# Patient Record
Sex: MALE | Race: WHITE | Employment: FULL TIME | URBAN - METROPOLITAN AREA
[De-identification: names, ages, dates, MRNs, and addresses within clinical notes are randomized per-mention and may not be internally consistent; named-entity substitution may affect disease eponyms.]

---

## 2017-03-07 ENCOUNTER — TELEPHONE (OUTPATIENT)
Dept: CARDIOLOGY CLINIC | Age: 47
End: 2017-03-07

## 2017-03-07 NOTE — TELEPHONE ENCOUNTER
Mr. Jemma Levin saw Dr. Beatris Carbone a couple years back and while the two of them were conversing Dr. Beatris Carbone mentioned him having some lab testing done along with cardiac testing. Mr. Jemma Levin would like to know if Dr. Beatris Carbone would be willing to provide orders for these before his scheduled appointment on 4/3/17. Phone [phone].      Thank you, Eduardo Leach

## 2017-03-07 NOTE — TELEPHONE ENCOUNTER
Melida Rollins will have my nurse Lesly Bush coordinate lab testing prior to your visit. Look forward to seeing you. Lesly Bush   Can you send Mr Moffett a lab slip for [facility] to be drawn 2 weeks prior to his office visit? From: [email or web address]  Date: March 7, 2017 at 10:36:37 AM AST  To: [email or web address]  Subject: #ExtMail# Appointment on April 3      Dear Bryan Luna,   I had the pleasure of meeting you a couple of years ago at a [employer] event. I just scheduled an appointment with you for April 3 and was hoping your office could order an advanced lipid test prior to our appointment (so that we could discuss on April 3). For what it is worth I also completed a CT scan with Jeremy Delricky last year. Best regards,   Dena Postin    E-mail received. Patient notified that lab requisition has been mailed. [facility] lab number provided for billing questions.

## 2017-03-24 ENCOUNTER — TELEPHONE (OUTPATIENT)
Dept: CARDIOLOGY CLINIC | Age: 47
End: 2017-03-24

## 2017-03-24 DIAGNOSIS — E78.9 ABNORMAL CHOLESTEROL TEST: Primary | ICD-10-CM

## 2017-03-29 LAB
25(OH)D3+25(OH)D2 SERPL-MCNC: 23.9 NG/ML (ref 30–100)
ALBUMIN SERPL-MCNC: 4.4 G/DL (ref 3.5–5.5)
ALBUMIN/GLOB SERPL: 1.7 {RATIO} (ref 1.2–2.2)
ALP SERPL-CCNC: 41 IU/L (ref 39–117)
ALT SERPL-CCNC: 19 IU/L (ref 0–44)
AST SERPL-CCNC: 23 IU/L (ref 0–40)
BILIRUB SERPL-MCNC: 0.7 MG/DL (ref 0–1.2)
BUN SERPL-MCNC: 19 MG/DL (ref 6–24)
BUN/CREAT SERPL: 18 (ref 9–20)
CALCIUM SERPL-MCNC: 9.5 MG/DL (ref 8.7–10.2)
CHLORIDE SERPL-SCNC: 101 MMOL/L (ref 96–106)
CHOLEST SERPL-MCNC: 185 MG/DL (ref 100–199)
CO2 SERPL-SCNC: 27 MMOL/L (ref 18–29)
CREAT SERPL-MCNC: 1.08 MG/DL (ref 0.76–1.27)
GLOBULIN SER CALC-MCNC: 2.6 G/DL (ref 1.5–4.5)
GLUCOSE SERPL-MCNC: 96 MG/DL (ref 65–99)
HBA1C MFR BLD: 5.9 % (ref 4.8–5.6)
HDL SERPL-SCNC: 29.6 UMOL/L
HDLC SERPL-MCNC: 61 MG/DL
INTERPRETATION, 910389: NORMAL
LDL SERPL QN: 20.9 NM
LDL SERPL-SCNC: 1071 NMOL/L
LDL SMALL SERPL-SCNC: 384 NMOL/L
LDLC SERPL CALC-MCNC: 107 MG/DL (ref 0–99)
LP-IR SCORE SERPL: 27
POTASSIUM SERPL-SCNC: 4.1 MMOL/L (ref 3.5–5.2)
PROT SERPL-MCNC: 7 G/DL (ref 6–8.5)
SODIUM SERPL-SCNC: 141 MMOL/L (ref 134–144)
TRIGL SERPL-MCNC: 86 MG/DL (ref 0–149)

## 2017-04-03 ENCOUNTER — OFFICE VISIT (OUTPATIENT)
Dept: CARDIOLOGY CLINIC | Age: 47
End: 2017-04-03

## 2017-04-03 VITALS
RESPIRATION RATE: 16 BRPM | OXYGEN SATURATION: 98 % | SYSTOLIC BLOOD PRESSURE: 118 MMHG | DIASTOLIC BLOOD PRESSURE: 72 MMHG | HEART RATE: 58 BPM | WEIGHT: 172 LBS

## 2017-04-03 DIAGNOSIS — E55.9 VITAMIN D DEFICIENCY: ICD-10-CM

## 2017-04-03 DIAGNOSIS — R07.89 OTHER CHEST PAIN: Primary | ICD-10-CM

## 2017-04-03 NOTE — PROGRESS NOTES
Shaival J. Loreen Habermann, [facility] [address]  [address] [place]  [place], [address] [facility]    Office [phone]  Fax [phone]  Cell [phone]        Arnoldo Hsieh is a 42 y.o. male self-referred for evaluation and management of cardiac risk. Assessment  Encounter Diagnoses   Name Primary?  Other chest pain Yes    Vitamin D deficiency      Recommendations:    Arnoldo Hsieh has intermittent exertional chest sensations predominantly while swimming not running. I suspect this is more of a respiratory issues, but favor baseline stress echo to ensure there is no structural or ischemic heart disease. CT heart scan 2 years ago was normal. Recent advanced lipid profile with NMR demonstrates a benign profile. He is in good physical condition. I see no signals indicating genetic risk of CAD despite his maternal history. Phone follow up after reviewing tests. Subjective:    Mr. Allison Palencia is a very healthy male with no cardiac history. He reports intermittent spells of chest discomfort when he's swimming and best described as \"shock sensation that is painful\". He continues to exercise despite palpitations as these spells will usually resolve with time. His symptoms rarely occur with running or biking and he does not experience these symptoms at rest. Unsure of his heart rate during these spells as he does not check it. Cardiac evaluation many years ago showed negative EKG and echo. Patient denies any exertional chest pain, dyspnea, syncope, orthopnea, edema or paroxysmal nocturnal dyspnea. Cardiac risk factors   HTN no  DM no  Smoking no  Family hx of CAD yes- History of CAD on maternal side including Aunt with MI her 45s and grandfather with MI.     Cardiac testing  CT heart scan 2015- score 0    No past medical history on file. No Known Allergies     Review of Systems  Constitutional: Negative for fever, chills, malaise/fatigue and diaphoresis.    Respiratory: Negative for cough, hemoptysis, sputum production, shortness of breath and wheezing. Cardiovascular: Negative for orthopnea, claudication, leg swelling and PND. Positive for chest discomfort. Gastrointestinal: Negative for heartburn, nausea, vomiting, blood in stool and melena. Genitourinary: Negative for dysuria and flank pain. Musculoskeletal: Negative for joint pain and back pain. Skin: Negative for rash. Neurological: Negative for focal weakness, seizures, loss of consciousness, weakness and headaches. Endo/Heme/Allergies: Does not bruise/bleed easily. Psychiatric/Behavioral: Negative for memory loss. The patient does not have insomnia. Works with [employer]. Physical Exam    Visit Vitals    /72 (BP 1 Location: Left arm, BP Patient Position: Sitting)    Pulse (!) 58    Resp 16    Wt 172 lb (78 kg)    SpO2 98%     Wt Readings from Last 3 Encounters:   04/03/17 172 lb (78 kg)      Lab Results   Component Value Date/Time    Sodium 141 03/28/2017 07:46 AM    Potassium 4.1 03/28/2017 07:46 AM    Chloride 101 03/28/2017 07:46 AM    CO2 27 03/28/2017 07:46 AM    Glucose 96 03/28/2017 07:46 AM    BUN 19 03/28/2017 07:46 AM    Creatinine 1.08 03/28/2017 07:46 AM    BUN/Creatinine ratio 18 03/28/2017 07:46 AM    GFR est AA 96 03/28/2017 07:46 AM    GFR est non-AA 83 03/28/2017 07:46 AM    Calcium 9.5 03/28/2017 07:46 AM    Bilirubin, total 0.7 03/28/2017 07:46 AM    AST (SGOT) 23 03/28/2017 07:46 AM    Alk.  phosphatase 41 03/28/2017 07:46 AM    Protein, total 7.0 03/28/2017 07:46 AM    Albumin 4.4 03/28/2017 07:46 AM    A-G Ratio 1.7 03/28/2017 07:46 AM    ALT (SGPT) 19 03/28/2017 07:46 AM     Results for orders placed or performed in visit on 03/24/17   NMR LIPOPROFILE     Status: Abnormal   Result Value Ref Range Status    LDL-P 1071 (H) <1000 nmol/L Final     Comment:                           Low                   < 1000                            Moderate         1000 - 1299 Borderline-High  1300 - 1599                            High             1600 - 2000                            Very High             > 2000      LDL-C 107 (H) 0 - 99 mg/dL Final     Comment:                           Optimal               <  100                            Above optimal     100 -  129                            Borderline        130 -  159                            High              160 -  189                            Very high             >  189  LDL-C is inaccurate if patient is non-fasting. HDL-C 61 >39 mg/dL Final    Triglycerides 86 0 - 149 mg/dL Final    Cholesterol, Total 185 100 - 199 mg/dL Final    HDL-P (Total) 29.6 (L) >=30.5 umol/L Final    Small LDL-P 384 <=527 nmol/L Final    LDL size 20.9 >20.5 nm Final     Comment:  ----------------------------------------------------------                   ** INTERPRETATIVE INFORMATION**                   PARTICLE CONCENTRATION AND SIZE                      <--Lower CVD Risk   Higher CVD Risk-->    LDL AND HDL PARTICLES   Percentile in Reference Population    HDL-P (total)        High     75th    50th    25th   Low                         >34.9    34.9    30.5    26.7   <26.7    Small LDL-P          Low      25th    50th    75th   High                         <117     117     527     839    >839    LDL Size   <-Large (Pattern A)->    <-Small (Pattern B)->                      23.0    20.6           20.5      19.0   ----------------------------------------------------------  Small LDL-P and LDL Size are associated with CVD risk, but not after  LDL-P is taken into account. These assays were developed and their performance characteristics  determined by [employer]. These assays have not been cleared by the  [employer] and Drug Administration. The clinical utility o  f these  laboratory values have not been fully established.       LP-IR SCORE 27 <=45 Final     Comment: INSULIN RESISTANCE MARKER      <--Insulin Sensitive    Insulin Resistant-->             Percentile in Reference Population  Insulin Resistance Score  LP-IR Score   Low   25th   50th   75th   High                <27   27     45     63     >63  LP-IR Score is inaccurate if patient is non-fasting. The LP-IR score is a laboratory developed index that has been  associated with insulin resistance and diabetes risk and should be  used as one component of a physician's clinical assessment. The  LP-IR score listed above has not been cleared by the [employer] and  Drug Administration. Narrative    Performed at:  [address]  [id number]  : Reggie Lyon MD, Phone:  [phone]     Lab Results   Component Value Date/Time    Hemoglobin A1c 5.9 03/28/2017 07:46 AM     General - well developed well nourished  Neck - JVP normal, thyroid nl  Cardiac - normal S1, S2, no murmurs, rubs or gallops.  No clicks  Vascular - carotids without bruits, radials, femorals and pedal pulses equal bilateral  Lungs - clear to auscultation bilaterals, no rales, wheezing or rhonchi  Abd - soft nontender, no HSM, no abd bruits  Extremities - no edema  Skin - no rash  Neuro - nonfocal  Psych - normal mood and affect    Cardiographics    EKG 04/03/17- SR 57, normal   CT heart scan 2015- score 0     Written by Tabby Tyler, as dictated by Haley Davis MD.   Haley Davis MD

## 2017-04-03 NOTE — MR AVS SNAPSHOT
Visit Information Date & Time Provider Department Dept. Phone Encounter #  
 4/3/2017  3:40 PM Corrine Kumar MD [facility] [phone] [id number] Upcoming Health Maintenance Date Due DTaP/Tdap/Td series (1 - Tdap) 12/25/1993 INFLUENZA AGE 9 TO ADULT 8/1/2016 Allergies as of 4/3/2017  Review Complete On: 4/3/2017 By: Chetna Miguel LPN No Known Allergies Current Immunizations  Never Reviewed No immunizations on file. Not reviewed this visit You Were Diagnosed With   
  
 Codes Comments Palpitations    -  Primary ICD-10-CM: R00.2 ICD-9-CM: 785.1 Vitals BP Pulse Resp Weight(growth percentile) SpO2  
  
 118/72 (BP 1 Location: Left arm, BP Patient Position: Sitting) (!) 58 16 172 lb (78 kg) 98% Preferred Pharmacy Pharmacy Name Phone Y[facility]ccon[facility] [phone] Your Updated Medication List  
  
Notice  As of 4/3/2017  4:32 PM  
 You have not been prescribed any medications. We Performed the Following AMB POC EKG ROUTINE W/ 12 LEADS, INTER & REP [65956 CPT(R)] Patient Instructions 1. Start taking Vitamin D3 2,000 IU daily. Introducing [facility] & HEALTH SERVICES! [employer] introduces [employer] patient portal. Now you can access parts of your medical record, email your doctor's office, and request medication refills online. 1. In your internet browser, go to [email or web address] 2. Click on the First Time User? Click Here link in the Sign In box. You will see the New Member Sign Up page. 3. Enter your [employer] Access Code exactly as it appears below. You will not need to use this code after youve completed the sign-up process. If you do not sign up before the expiration date, you must request a new code. · [employer] Access Code: [email or web address] Expires: 7/2/2017  4:32 PM 
 
 4. Enter the last four digits of your Social Security Number (xxxx) and Date of Birth (mm/dd/yyyy) as indicated and click Submit. You will be taken to the next sign-up page. 5. Create a [employer] ID. This will be your [employer] login ID and cannot be changed, so think of one that is secure and easy to remember. 6. Create a [employer] password. You can change your password at any time. 7. Enter your Password Reset Question and Answer. This can be used at a later time if you forget your password. 8. Enter your e-mail address. You will receive e-mail notification when new information is available in [address] 9. Click Sign Up. You can now view and download portions of your medical record. 10. Click the Download Summary menu link to download a portable copy of your medical information. If you have questions, please visit the Frequently Asked Questions section of the [employer] website. Remember, [employer] is NOT to be used for urgent needs. For medical emergencies, dial 911. Now available from your iPhone and Android! Please provide this summary of care documentation to your next provider. If you have any questions after today's visit, please call [phone].

## 2017-04-23 PROBLEM — R07.9 CHEST PAIN: Status: ACTIVE | Noted: 2017-04-23

## 2017-04-23 PROBLEM — E55.9 VITAMIN D DEFICIENCY: Status: ACTIVE | Noted: 2017-04-23

## 2017-04-25 ENCOUNTER — TELEPHONE (OUTPATIENT)
Dept: CARDIOLOGY CLINIC | Age: 47
End: 2017-04-25

## 2017-04-25 NOTE — TELEPHONE ENCOUNTER
Good Afternoon     I have received Notice back from  insurance that pts Stress Echo Test has been Denied. Patient is Scheduled for tomorrow April 26,2017 @9:00am       Dr Isa Zhu can Call  and do Peer to peer to try and change Decision and get test Approved ([phone] Opt#1 then Opt #3 )     [employer]   suggest Treadmill Stress test And Echocardiogram can be done .  Because Both of these Test Do Not Require Authorization.      Please contact patient and advise and Cancel Stress Echo test. Thanks     Prachi Stephen

## 2017-04-25 NOTE — TELEPHONE ENCOUNTER
Left a message for patient to return my call.  Per MD, will proceed with regular echo and treadmill stress test.

## 2017-04-26 ENCOUNTER — CLINICAL SUPPORT (OUTPATIENT)
Dept: CARDIOLOGY CLINIC | Age: 47
End: 2017-04-26

## 2017-04-26 DIAGNOSIS — I99.8 ISCHEMIA: Primary | ICD-10-CM

## 2017-04-26 DIAGNOSIS — R07.89 OTHER CHEST PAIN: ICD-10-CM

## 2017-05-03 ENCOUNTER — TELEPHONE (OUTPATIENT)
Dept: CARDIOLOGY CLINIC | Age: 47
End: 2017-05-03

## 2017-05-03 NOTE — TELEPHONE ENCOUNTER
Cardiac testing  CT heart scan 2015- score 0    Echo 4/26/17 - EF 55-60%. No WMA. ETT - normal study    Mr. Loreto Galvez was seen as a new patient by Dr. Tori Del Real on 4/3/17, self referred for evaluation of cardiac risk in the setting of a strong family hx of CAD. Notified him that ETT and Echo were within normal limits. CT heart scan 2 years ago showed a calcium score of 0. Encouraged him to continue a heart healthy diet to stay active to reduce his overall risk of CAD in the future. He was advised to return as needed.

## 2018-05-22 ENCOUNTER — OFFICE VISIT (OUTPATIENT)
Dept: FAMILY MEDICINE CLINIC | Age: 48
End: 2018-05-22

## 2018-05-22 VITALS
RESPIRATION RATE: 16 BRPM | HEIGHT: 74 IN | TEMPERATURE: 99 F | BODY MASS INDEX: 21.87 KG/M2 | SYSTOLIC BLOOD PRESSURE: 100 MMHG | OXYGEN SATURATION: 98 % | WEIGHT: 170.4 LBS | DIASTOLIC BLOOD PRESSURE: 64 MMHG | HEART RATE: 62 BPM

## 2018-05-22 DIAGNOSIS — Z00.00 ROUTINE GENERAL MEDICAL EXAMINATION AT HEALTH CARE FACILITY: Primary | ICD-10-CM

## 2018-05-22 DIAGNOSIS — E55.9 VITAMIN D DEFICIENCY: ICD-10-CM

## 2018-05-22 DIAGNOSIS — Z13.220 SCREENING FOR LIPID DISORDERS: ICD-10-CM

## 2018-05-22 DIAGNOSIS — Z13.1 SCREENING FOR DIABETES MELLITUS: ICD-10-CM

## 2018-05-22 DIAGNOSIS — Z12.5 SPECIAL SCREENING FOR MALIGNANT NEOPLASM OF PROSTATE: ICD-10-CM

## 2018-05-22 NOTE — PROGRESS NOTES
Chief Complaint   Patient presents with    New Patient     establishment     1. Have you been to the ER, urgent care clinic since your last visit? Hospitalized since your last visit? No    2. Have you seen or consulted any other health care providers outside of the [address] [place] [address] since your last visit? Include any pap smears or colon screening.  No  Visit Vitals    /64 (BP 1 Location: Left arm, BP Patient Position: Sitting)    Pulse 62    Temp 99 °F (37.2 °C) (Oral)    Resp 16    Ht 6' 1.5\" (1.867 m)    Wt 170 lb 6.4 oz (77.3 kg)    SpO2 98%    BMI 22.18 kg/m2

## 2018-05-22 NOTE — MR AVS SNAPSHOT
[address] 
[phone] Patient: Levon Cunningham MRN: [id number] :1970 Visit Information Date & Time Provider Department Dept. Phone Encounter #  
 2018  2:45 PM Conner Arzate  [facility] [phone] [id number] Follow-up Instructions Return in about 1 year (around 2019) for CPE (30 min). Upcoming Health Maintenance Date Due DTaP/Tdap/Td series (1 - Tdap) 1993 Influenza Age 5 to Adult 2018 Allergies as of 2018  Review Complete On: 2018 By: Bobbi Florentino LPN No Known Allergies Current Immunizations  Never Reviewed No immunizations on file. Not reviewed this visit You Were Diagnosed With   
  
 Codes Comments Routine general medical examination at health care facility    -  Primary ICD-10-CM: Z00.00 ICD-9-CM: V70.0 Screening for lipid disorders     ICD-10-CM: Z13.220 ICD-9-CM: V77.91 Screening for diabetes mellitus     ICD-10-CM: Z13.1 ICD-9-CM: V77.1 Screening for STD (sexually transmitted disease)     ICD-10-CM: Z11.3 ICD-9-CM: V74.5 Special screening for malignant neoplasm of prostate     ICD-10-CM: Z12.5 ICD-9-CM: V76.44 Vitals BP Pulse Temp Resp Height(growth percentile) Weight(growth percentile) 100/64 (BP 1 Location: Left arm, BP Patient Position: Sitting) 62 99 °F (37.2 °C) (Oral) 16 6' 1.5\" (1.867 m) 170 lb 6.4 oz (77.3 kg) SpO2 BMI Smoking Status 98% 22.18 kg/m2 Never Smoker Vitals History BMI and BSA Data Body Mass Index Body Surface Area  
 22.18 kg/m 2 2 m 2 Preferred Pharmacy Pharmacy Name Phone [facility] [phone] Your Updated Medication List  
  
Notice  As of 2018  3:29 PM  
 You have not been prescribed any medications. We Performed the Following HEMOGLOBIN A1C WITH EAG [68483 CPT(R)] LIPID PANEL [23929 CPT(R)] METABOLIC PANEL, COMPREHENSIVE [02558 CPT(R)] PSA, DIAGNOSTIC (PROSTATE SPECIFIC AG) [id number] CPT(R)] Follow-up Instructions Return in about 1 year (around 5/22/2019) for CPE (30 min). Introducing [facility] & HEALTH SERVICES! [facility] introduces [employer] patient portal. Now you can access parts of your medical record, email your doctor's office, and request medication refills online. 1. In your internet browser, go to [email or web address] 2. Click on the First Time User? Click Here link in the Sign In box. You will see the New Member Sign Up page. 3. Enter your [employer] Access Code exactly as it appears below. You will not need to use this code after youve completed the sign-up process. If you do not sign up before the expiration date, you must request a new code. · [employer] Access Code: Tulio Landin Expires: 8/20/2018  3:02 PM 
 
4. Enter the last four digits of your Social Security Number (xxxx) and Date of Birth (mm/dd/yyyy) as indicated and click Submit. You will be taken to the next sign-up page. 5. Create a [employer] ID. This will be your [employer] login ID and cannot be changed, so think of one that is secure and easy to remember. 6. Create a [employer] password. You can change your password at any time. 7. Enter your Password Reset Question and Answer. This can be used at a later time if you forget your password. 8. Enter your e-mail address. You will receive e-mail notification when new information is available in [address] 9. Click Sign Up. You can now view and download portions of your medical record. 10. Click the Download Summary menu link to download a portable copy of your medical information. If you have questions, please visit the Frequently Asked Questions section of the [employer] website.  Remember, [employer] is NOT to be used for urgent needs. For medical emergencies, dial 911. Now available from your iPhone and Android! Please provide this summary of care documentation to your next provider. Your primary care clinician is listed as Salty Collado. If you have any questions after today's visit, please call [phone].

## 2018-05-22 NOTE — PROGRESS NOTES
Patient Name: Marcio Patton   MRN: [id number]    Edna Balke is a 41 y.o. male who presents with the following: Here to establish care with new PCP. Colon Cancer Screening: up to date; every 5 year due to PGF hx of colon cancer  STD screening: declines  PSA: no fhx of prostate or personal hx of prostate issues; pt would like PSA    CAD risk factors:  HTN: wnl no meds. Chronic history of low blood pressure. Intermittently has some dizziness; does stay well hydrated. Had cardiac work up last year due to atypical chest pain and fhx of CAD; negative stress echo. Lipid: due  Lab Results   Component Value Date/Time    Cholesterol, Total 185 03/28/2017 07:46 AM     DM: due  Lab Results   Component Value Date/Time    Hemoglobin A1c 5.9 (H) 03/28/2017 07:46 AM         Review of Systems   Constitutional: Negative for chills, fever, malaise/fatigue and weight loss. HENT: Negative for hearing loss, nosebleeds and sore throat. Respiratory: Negative for cough, sputum production, shortness of breath and wheezing. Cardiovascular: Negative for chest pain, palpitations, leg swelling and PND. Gastrointestinal: Negative for abdominal pain, blood in stool, constipation, diarrhea, nausea and vomiting. Genitourinary: Negative for dysuria, frequency and urgency. Musculoskeletal: Negative for back pain, falls, joint pain, myalgias and neck pain. Skin: Negative for itching and rash. Neurological: Negative for dizziness, sensory change, focal weakness and loss of consciousness. Psychiatric/Behavioral: Negative for depression. The patient is not nervous/anxious. All other systems reviewed and are negative. The patient's medications, allergies, past medical history, surgical history, family history and social history were reviewed and updated where appropriate.       Prior to Admission medications    Not on File       No Known Allergies      Past Medical History:   Diagnosis Date    Arthritis Past Surgical History:   Procedure Laterality Date    HX APPENDECTOMY         Family History   Problem Relation Age of Onset    Cancer Mother      breast    Heart Disease Mother     Heart Disease Maternal Grandfather      early heart attack at age 48    Cancer Paternal Grandmother      colon    No Known Problems Father        Social History     Social History    Marital status:      Spouse name: N/A    Number of children: N/A    Years of education: N/A     Occupational History    Not on file. Social History Main Topics    Smoking status: Never Smoker    Smokeless tobacco: Never Used    Alcohol use Yes      Comment: [id number]    Drug use: No    Sexual activity: Yes     Other Topics Concern    Not on file     Social History Narrative    No narrative on file           OBJECTIVE    Visit Vitals    /64 (BP 1 Location: Left arm, BP Patient Position: Sitting)    Pulse 62    Temp 99 °F (37.2 °C) (Oral)    Resp 16    Ht 6' 1.5\" (1.867 m)    Wt 170 lb 6.4 oz (77.3 kg)    SpO2 98%    BMI 22.18 kg/m2       Physical Exam   Constitutional: He is oriented to person, place, and time and well-developed, well-nourished, and in no distress. No distress. Eyes: Conjunctivae and EOM are normal. Pupils are equal, round, and reactive to light. Cardiovascular: Normal rate, regular rhythm and normal heart sounds. Exam reveals no gallop and no friction rub. No murmur heard. Pulmonary/Chest: Effort normal and breath sounds normal. No respiratory distress. He has no wheezes. Neurological: He is alert and oriented to person, place, and time. Skin: Skin is warm and dry. No rash noted. He is not diaphoretic. Psychiatric: Mood, memory, affect and judgment normal.   Nursing note and vitals reviewed. ASSESSMENT AND PLAN  Lily Robles is a [address] y.o. male who presents today for:    1.  Routine general medical examination at health care facility  Reviewed age appropriate screening tests as recommended by the USPSTF Preventive Services Database with patient today. 2. Screening for lipid disorders  Will calculate ASCVD risk score pending labs. - METABOLIC PANEL, COMPREHENSIVE  - LIPID PANEL    3. Screening for diabetes mellitus  - HEMOGLOBIN A1C WITH EAG    4. Special screening for malignant neoplasm of prostate  Regarding PSA-based prostate cancer screening, I discussed with patient the following:   Prostate cancer screening with PSA carries a \"D\" recommendation from the USPSTF due to small or absent evidence of benefit and clinically important harms due to overdiagnosis and overtreatment. The American Cancer Society (ACS) recommends that men have a chance to make an informed decision with their health care provider about whether to be screened for prostate cancer. After a discussion of uncertainties, risks, and potential benefits of prostate cancer screening, patient today would like to obtain PSA. - PROSTATE SPECIFIC AG         There are no discontinued medications. Follow-up Disposition:  Return in about 1 year (around 5/22/2019) for CPE (30 min). Medication risks/benefits/costs/interactions/alternatives discussed with patient. Advised patient to call back or return to office if symptoms worsen/change/persist. If patient cannot reach us or should anything more severe/urgent arise he/she should proceed directly to the nearest emergency department. Discussed expected course/resolution/complications of diagnosis in detail with patient. Patient given a written after visit summary which includes his/her diagnoses, current medications and vitals. Patient expressed understanding with the diagnosis and plan.      Tania Bauer M.D.

## 2020-09-16 ENCOUNTER — TELEPHONE (OUTPATIENT)
Dept: FAMILY MEDICINE CLINIC | Age: 50
End: 2020-09-16

## 2020-09-16 DIAGNOSIS — E55.9 VITAMIN D DEFICIENCY: Primary | ICD-10-CM

## 2020-09-16 DIAGNOSIS — Z13.220 SCREENING FOR LIPID DISORDERS: ICD-10-CM

## 2020-09-16 DIAGNOSIS — Z12.5 PROSTATE CANCER SCREENING: ICD-10-CM

## 2020-09-16 DIAGNOSIS — Z13.1 SCREENING FOR DIABETES MELLITUS: ICD-10-CM

## 2020-09-16 NOTE — TELEPHONE ENCOUNTER
Outbound call to patient. Patient request lab orders for upcoming appointment be mailed to home address on file.     Will route to provider for orders

## 2020-09-16 NOTE — TELEPHONE ENCOUNTER
----- Message from [place], LPN sent at 5/41/1087  3:04 PM EDT -----  Regarding: FW: /Telephone    ----- Message -----  From: Jimbo Castro  Sent: 9/14/2020  11:00 AM EDT  To: [facility]  Subject: /Telephone                           Appointment not available    Caller's first and last name and relationship to patient (if not the patient):      Best contact number: [phone]      Preferred date and time: before upcoming appt on 10/29/20      Scheduled appointment date and time: n/a       Reason for appointment: lab      Details to clarify the request:      Anne Crews

## 2020-10-20 ENCOUNTER — PATIENT MESSAGE (OUTPATIENT)
Dept: FAMILY MEDICINE CLINIC | Age: 50
End: 2020-10-20

## 2020-10-20 DIAGNOSIS — R97.20 ELEVATED PSA: Primary | ICD-10-CM

## 2020-10-29 ENCOUNTER — OFFICE VISIT (OUTPATIENT)
Dept: FAMILY MEDICINE CLINIC | Age: 50
End: 2020-10-29
Payer: COMMERCIAL

## 2020-10-29 VITALS
HEART RATE: 81 BPM | RESPIRATION RATE: 20 BRPM | TEMPERATURE: 98.5 F | DIASTOLIC BLOOD PRESSURE: 60 MMHG | BODY MASS INDEX: 20.92 KG/M2 | WEIGHT: 163 LBS | SYSTOLIC BLOOD PRESSURE: 93 MMHG | OXYGEN SATURATION: 99 % | HEIGHT: 74 IN

## 2020-10-29 DIAGNOSIS — Z01.818 PRE-OP EVALUATION: ICD-10-CM

## 2020-10-29 DIAGNOSIS — Z00.00 ROUTINE GENERAL MEDICAL EXAMINATION AT HEALTH CARE FACILITY: Primary | ICD-10-CM

## 2020-10-29 DIAGNOSIS — I45.10 RBBB: ICD-10-CM

## 2020-10-29 PROCEDURE — 99396 PREV VISIT EST AGE 40-64: CPT | Performed by: FAMILY MEDICINE

## 2020-10-29 PROCEDURE — 99213 OFFICE O/P EST LOW 20 MIN: CPT | Performed by: FAMILY MEDICINE

## 2020-10-29 PROCEDURE — 93000 ELECTROCARDIOGRAM COMPLETE: CPT | Performed by: FAMILY MEDICINE

## 2020-10-29 RX ORDER — MUPIROCIN 20 MG/G
OINTMENT TOPICAL
COMMUNITY
Start: 2020-10-07

## 2020-10-29 NOTE — PROGRESS NOTES
Patient Name: Desmond Gan   MRN: [id number]    Jada Hi is a 54 y.o. male who presents with the following:     Colon Cancer Screening: up to date. CAD risk factors:  HTN: wnl  BP Readings from Last 3 Encounters:   10/29/20 93/60   05/22/18 100/64   04/03/17 118/72     Lipid:  Labs pending  Lab Results   Component Value Date/Time    Cholesterol, Total 185 03/28/2017 07:46 AM     DM: labs pending  Lab Results   Component Value Date/Time    Hemoglobin A1c 5.9 (H) 03/28/2017 07:46 AM     Has noticed some weight loss over the years. States that he does try to eat healthy and exercise and has been focusing more on it recently. Wt Readings from Last 3 Encounters:   10/30/20 162 lb (73.5 kg)   10/29/20 163 lb (73.9 kg)   05/22/18 170 lb 6.4 oz (77.3 kg)     Pre Op  Procedure: R total hip replacement  Expected Date: 12/9/2020  Surgeon: Dr. Zula Krabbe   Hx of complications with general anesthesia: none  Signs and symptoms of cardiovascular disease: none  Have any of the following: CVA, CHF, Cr > 2.0, insulin-dependent DM, ischemic cardiac disease, or suprainguinal vascular/intrathroacic/intraabdominal surgery:  none  Able to meet > 4 METS: yes  STOP-BANG (snoring, tiredness, observed apnea, high BP, BMI>35, age >47, neck circumference> 15 in, male): 3+ risk factors - N/A     Review of Systems   Constitutional: Negative for fever, malaise/fatigue and weight loss. Respiratory: Negative for cough, hemoptysis, shortness of breath and wheezing. Cardiovascular: Negative for chest pain, palpitations, leg swelling and PND. Gastrointestinal: Negative for abdominal pain, constipation, diarrhea, nausea and vomiting. The patient's medications, allergies, past medical history, surgical history, family history and social history were reviewed and updated where appropriate. Prior to Admission medications    Medication Sig Start Date End Date Taking?  Authorizing Provider   blas Nur) 2 % ointment SWAB EACH NOSTRIL D FOR 2 DAYS PRIOR TO SURGERY AND THE MORNING OF SURGERY.  THEN APPLY TO INCISON POST-OP UTD 10/7/20   Provider, Historical       No Known Allergies      Past Medical History:   Diagnosis Date    Arthritis        Past Surgical History:   Procedure Laterality Date    HX APPENDECTOMY         Family History   Problem Relation Age of Onset    Cancer Mother         breast    Heart Disease Mother     Heart Disease Maternal Grandfather         early heart attack at age 48   [de-identified] Cancer Paternal Grandmother         colon    No Known Problems Father        Social History     Socioeconomic History    Marital status:      Spouse name: Not on file    Number of children: Not on file    Years of education: Not on file    Highest education level: Not on file   Occupational History    Not on file   Social Needs    Financial resource strain: Not on file    Food insecurity     Worry: Not on file     Inability: Not on file    Transportation needs     Medical: Not on file     Non-medical: Not on file   Tobacco Use    Smoking status: Never Smoker    Smokeless tobacco: Never Used   Substance and Sexual Activity    Alcohol use: Yes     Comment: [id number]    Drug use: No    Sexual activity: Yes   Lifestyle    Physical activity     Days per week: Not on file     Minutes per session: Not on file    Stress: Not on file   Relationships    Social connections     Talks on phone: Not on file     Gets together: Not on file     Attends Rastafari service: Not on file     Active member of club or organization: Not on file     Attends meetings of clubs or organizations: Not on file     Relationship status: Not on file    Intimate partner violence     Fear of current or ex partner: Not on file     Emotionally abused: Not on file     Physically abused: Not on file     Forced sexual activity: Not on file   Other Topics Concern    Not on file   Social History Narrative    Not on file OBJECTIVE    Visit Vitals  BP 93/60   Pulse 81   Temp 98.5 °F (36.9 °C) (Temporal)   Resp 20   Ht 6' 1.5\" (1.867 m)   Wt 163 lb (73.9 kg)   SpO2 99%   BMI 21.21 kg/m²       Physical Exam  Constitutional:       General: He is not in acute distress. Appearance: He is not diaphoretic. HENT:      Head: Normocephalic. Right Ear: External ear normal.      Left Ear: External ear normal.   Eyes:      Conjunctiva/sclera: Conjunctivae normal.      Pupils: Pupils are equal, round, and reactive to light. Cardiovascular:      Rate and Rhythm: Normal rate and regular rhythm. Pulses:           Carotid pulses are 2+ on the right side and 2+ on the left side. Heart sounds: Normal heart sounds. No murmur. No friction rub. No gallop. Pulmonary:      Effort: Pulmonary effort is normal. No respiratory distress. Breath sounds: Normal breath sounds. No wheezing or rales. Abdominal:      General: Bowel sounds are normal. There is no distension. Palpations: Abdomen is soft. Tenderness: There is no abdominal tenderness. There is no guarding or rebound. Skin:     General: Skin is warm and dry. Neurological:      Mental Status: He is alert and oriented to person, place, and time. Psychiatric:         Mood and Affect: Affect normal.         Cognition and Memory: Memory normal.         Judgment: Judgment normal.           ASSESSMENT AND PLAN  Kirsten Marshall is a 54 y.o. male who presents today for:    1. Routine general medical examination at health care facility  Reviewed age appropriate screening tests as recommended by the [place]PST[place] Preventive Services Database with patient today. Labs pending. 2. Pre-op evaluation  Risk of cardiac death and nonfatal myocardial infarction for noncardiac surgical procedures:  Intermediate (1-5% due to orthopedic surgery). Revised Cardiac Risk Index of a major cardiac event is 0.4%.   Recommend pt to see cardiology for cardiac clearance given new RBBB; reviewed that pt is otherwise healthy so this likely will not be an issue but would appreciate formal cardiology clearance. - AMB POC EKG ROUTINE W/ 12 LEADS, INTER & REP  - XR CHEST PA LAT; Future    3. RBBB  Given new finding on EKG since last cardiac evaluation and pending surgery, would recommend evaluation by cardiology for cardiac clearance. - REFERRAL TO CARDIOLOGY       There are no discontinued medications. Treatment risks/benefits/costs/interactions/alternatives discussed with patient. Advised patient to call back or return to office if symptoms worsen/change/persist. If patient cannot reach us or should anything more severe/urgent arise he/she should proceed directly to the nearest emergency department. Discussed expected course/resolution/complications of diagnosis in detail with patient. Patient expressed understanding with the diagnosis and plan. Salty Estes M.D.

## 2020-10-29 NOTE — PROGRESS NOTES
Chief Complaint   Patient presents with    Complete Physical    Pre-op Exam     Right hip replacement 12/9/20; patient has some forms       1. Have you been to the ER, urgent care clinic since your last visit? Hospitalized since your last visit? No    2. Have you seen or consulted any other health care providers outside of the [address] [place] [address] since your last visit? Include any pap smears or colon screening. Yes When: 10/27-20 Where: [facility] Reason for visit: discuss hip replacement    3 most recent PHQ Screens 10/29/2020   Little interest or pleasure in doing things Not at all   Feeling down, depressed, irritable, or hopeless Not at all   Total Score PHQ 2 0       Abuse Screening Questionnaire 10/29/2020   Do you ever feel afraid of your partner? N   Are you in a relationship with someone who physically or mentally threatens you? N   Is it safe for you to go home?  Karla Villanueva

## 2020-10-29 NOTE — PROGRESS NOTES
[facility] [address]: Cindy Ibanez  [phone]    History of Present Illness:  Mr. Boston Hester is a 53 yo M with a family history of early coronary artery disease, referred by Dr. Geri Mercado for cardiac evaluation prior to right total hip surgery with Dr. Venus Montoya on 12/09/2020. From a symptom standpoint, he denies any major issues with chest pain. Every once in a while, he will get a sensation of sharp chest discomfort that just lasts a second while he is swimming, but this happens very inconsistently and he exercises very regularly. He has had a little bit less exercise due to the COVID virus and the gyms being closed. He does admit to a lot of stressors and he is moving today, as well as stressors with work. He does note occasional palpitations. His breathing has been normal.  He did have a stress test back in 2017 that was normal.  His preop EKG was normal sinus rhythm, incomplete right bundle branch block from 10/29/2020 and I reviewed this personally. Heart rate was 75. He is compensated on exam with clear lungs and no lower extremity edema. Blood pressure is 110/60 with a heart rate of 74. Fam hx. Aunt, mother CAD early. Soc hx. No tobacco  Assessment and Plan:    1. Preop cardiac evaluation, abnormal EKG. He is stable cardiac wise and low risk for cardiac complications. No additional cardiac evaluation is indicated at this time. Will send a clearance note to Dr. Coty Milan and Dr. Geri Mercado. His EKG finding is consistent with a normal variant and we discussed this and reassured him. 2. Incomplete RBBB  3. Family history of early coronary artery disease. He  has a past medical history of Arthritis. All other systems negative except as above. PE  Vitals:    10/30/20 0811   BP: 110/60   Pulse: 74   Resp: 18   SpO2: 99%   Weight: 162 lb (73.5 kg)   Height: 6' 1\" (1.854 m)    Body mass index is 21.37 kg/m².    General appearance - alert, well appearing, and in no distress  Mental status - affect appropriate to mood  Eyes - sclera anicteric, moist mucous membranes  Neck - supple, no JVD  Chest - clear to auscultation, no wheezes, rales or rhonchi  Heart - normal rate, regular rhythm, normal S1, S2, no murmurs, rubs, clicks or gallops  Abdomen - soft, nontender, nondistended, no masses or organomegaly  Neurological -  no focal deficit  Extremities - peripheral pulses normal, no pedal edema    Recent Labs:  Lab Results   Component Value Date/Time    Cholesterol, Total 185 03/28/2017 07:46 AM     Lab Results   Component Value Date/Time    Creatinine 1.08 03/28/2017 07:46 AM     Lab Results   Component Value Date/Time    BUN 19 03/28/2017 07:46 AM     Lab Results   Component Value Date/Time    Potassium 4.1 03/28/2017 07:46 AM     Lab Results   Component Value Date/Time    Hemoglobin A1c 5.9 (H) 03/28/2017 07:46 AM     No results found for: HGBPOC, HGB, HGBP, HGBEXT, HGBEXT  No results found for: PLT, PLTEXT, PLTEXT    Reviewed:  Past Medical History:   Diagnosis Date    Arthritis      Social History     Tobacco Use   Smoking Status Never Smoker   Smokeless Tobacco Never Used     Social History     Substance and Sexual Activity   Alcohol Use Yes    Alcohol/week: 4.0 standard drinks    Types: 4 Cans of beer per week    Comment: occassionally     No Known Allergies    Current Outpatient Medications   Medication Sig    mupirocin (BACTROBAN) 2 % ointment SWAB EACH NOSTRIL D FOR 2 DAYS PRIOR TO SURGERY AND THE MORNING OF SURGERY. THEN APPLY TO INCISON POST-OP UTD     No current facility-administered medications for this visit.         Shahzad Marshall MD  [facility] heart and Vascular [place]  [address] [facility] [address]  [address]

## 2020-10-30 ENCOUNTER — OFFICE VISIT (OUTPATIENT)
Dept: CARDIOLOGY CLINIC | Age: 50
End: 2020-10-30
Payer: COMMERCIAL

## 2020-10-30 VITALS
BODY MASS INDEX: 21.47 KG/M2 | DIASTOLIC BLOOD PRESSURE: 60 MMHG | RESPIRATION RATE: 18 BRPM | HEIGHT: 73 IN | OXYGEN SATURATION: 99 % | SYSTOLIC BLOOD PRESSURE: 110 MMHG | WEIGHT: 162 LBS | HEART RATE: 74 BPM

## 2020-10-30 DIAGNOSIS — I45.10 INCOMPLETE RBBB: ICD-10-CM

## 2020-10-30 DIAGNOSIS — Z01.810 PREOP CARDIOVASCULAR EXAM: ICD-10-CM

## 2020-10-30 DIAGNOSIS — R94.31 ABNORMAL EKG: Primary | ICD-10-CM

## 2020-10-30 LAB
25(OH)D3+25(OH)D2 SERPL-MCNC: 29.3 NG/ML (ref 30–100)
ALBUMIN SERPL-MCNC: 4.8 G/DL (ref 4–5)
ALBUMIN/GLOB SERPL: 2 {RATIO} (ref 1.2–2.2)
ALP SERPL-CCNC: 47 IU/L (ref 39–117)
ALT SERPL-CCNC: 16 IU/L (ref 0–44)
AST SERPL-CCNC: 21 IU/L (ref 0–40)
BILIRUB SERPL-MCNC: 0.9 MG/DL (ref 0–1.2)
BUN SERPL-MCNC: 12 MG/DL (ref 6–24)
BUN/CREAT SERPL: 12 (ref 9–20)
CALCIUM SERPL-MCNC: 9.3 MG/DL (ref 8.7–10.2)
CHLORIDE SERPL-SCNC: 101 MMOL/L (ref 96–106)
CHOLEST SERPL-MCNC: 201 MG/DL (ref 100–199)
CO2 SERPL-SCNC: 25 MMOL/L (ref 20–29)
CREAT SERPL-MCNC: 1 MG/DL (ref 0.76–1.27)
ERYTHROCYTE [DISTWIDTH] IN BLOOD BY AUTOMATED COUNT: 12.6 % (ref 11.6–15.4)
EST. AVERAGE GLUCOSE BLD GHB EST-MCNC: 114 MG/DL
GLOBULIN SER CALC-MCNC: 2.4 G/DL (ref 1.5–4.5)
GLUCOSE SERPL-MCNC: 93 MG/DL (ref 65–99)
HBA1C MFR BLD: 5.6 % (ref 4.8–5.6)
HCT VFR BLD AUTO: 44.5 % (ref 37.5–51)
HDLC SERPL-MCNC: 72 MG/DL
HGB BLD-MCNC: 15 G/DL (ref 13–17.7)
INTERPRETATION, 910389: NORMAL
LDLC SERPL CALC-MCNC: 118 MG/DL (ref 0–99)
MCH RBC QN AUTO: 30.2 PG (ref 26.6–33)
MCHC RBC AUTO-ENTMCNC: 33.7 G/DL (ref 31.5–35.7)
MCV RBC AUTO: 90 FL (ref 79–97)
PLATELET # BLD AUTO: 213 X10E3/UL (ref 150–450)
POTASSIUM SERPL-SCNC: 4.1 MMOL/L (ref 3.5–5.2)
PROT SERPL-MCNC: 7.2 G/DL (ref 6–8.5)
PSA SERPL-MCNC: 5.3 NG/ML (ref 0–4)
RBC # BLD AUTO: 4.97 X10E6/UL (ref 4.14–5.8)
SODIUM SERPL-SCNC: 140 MMOL/L (ref 134–144)
TRIGL SERPL-MCNC: 58 MG/DL (ref 0–149)
VLDLC SERPL CALC-MCNC: 11 MG/DL (ref 5–40)
WBC # BLD AUTO: 5.7 X10E3/UL (ref 3.4–10.8)

## 2020-10-30 PROCEDURE — 99243 OFF/OP CNSLTJ NEW/EST LOW 30: CPT | Performed by: INTERNAL MEDICINE

## 2020-10-30 NOTE — TELEPHONE ENCOUNTER
Dear  Sussyshaw Allen,    I wanted to follow up on your recent test results:    I did check your prostate marker with this set of labs which did come back elevated. I recommend that we repeat this test in one month to see if it normalizes. It may be a false positive but given that you mentioned yesterday your weight loss, I think it's important for us to follow up on this. I can fax the lab orders to your preferred [facility].  Cholesterol levels are a little elevated. Vitamin D level is slightly below normal; recommend over-the-counter vitamin D3 2000 units daily. I will fax over your preop paperwork to your surgeon. Let me know if you have any questions.

## 2020-10-30 NOTE — LETTER
10/30/2020 10:52 AM 
 
Patient:  Valeria Howell YOB: 1970 Date of Visit: 10/30/2020 Dear Rosa Elena Fleming MD 
[address] [place] [address] VIA Facsimile: [phone] Amira Tatum MD 
[address] [phone] VIA In Basket: Thank you for referring Mr. Valeria Howell to me for evaluation/treatment. Below are the relevant portions of my assessment and plan of care. Mr. Valeria Howell is a 53 yo M with a family history of early coronary artery disease, referred by Dr. Jennie Miranda for cardiac evaluation prior to right total hip surgery with Dr. Brenda Aviles on 12/09/2020. From a symptom standpoint, he denies any major issues with chest pain. Every once in a while, he will get a sensation of sharp chest discomfort that just lasts a second while he is swimming, but this happens very inconsistently and he exercises very regularly. He has had a little bit less exercise due to the COVID virus and the gyms being closed. He does admit to a lot of stressors and he is moving today, as well as stressors with work. He does note occasional palpitations. His breathing has been normal.  He did have a stress test back in 2017 that was normal.  His preop EKG was normal sinus rhythm, incomplete right bundle branch block from 10/29/2020 and I reviewed this personally. Heart rate was 75. He is compensated on exam with clear lungs and no lower extremity edema. Blood pressure is 110/60 with a heart rate of 74. Fam hx. Aunt, mother CAD early. Soc hx. No tobacco 
Assessment and Plan: 1. Preop cardiac evaluation, abnormal EKG. He is stable cardiac wise and low risk for cardiac complications. No additional cardiac evaluation is indicated at this time. Will send a clearance note to Dr. Jame Quintana and Dr. Jennie Miranda. His EKG finding is consistent with a normal variant and we discussed this and reassured him. 2. Incomplete RBBB 3. Family history of early coronary artery disease. If you have questions, please do not hesitate to call me. Sincerely, Dragan sIlas MD

## 2020-11-05 NOTE — TELEPHONE ENCOUNTER
Mirna Ganser, MD 11/5/2020 10:38 AM EST      ----- Message -----  From: Pat Joyce  Sent: 11/3/2020 3:54 PM EST  To: [facility] Nurse Pool  Subject: RE: Non-Urgent Medical Question     Dr. Frankey Pigeon,  Will you please give me a quick phone call ([phone]) when you have a chance about my elevated PSA levels. Please go ahead and fax the lab order to [facility] at [address] [facility], [address]. Thank you.  Rupal Bond

## 2020-11-06 ENCOUNTER — TELEPHONE (OUTPATIENT)
Dept: CARDIOLOGY CLINIC | Age: 50
End: 2020-11-06

## 2020-11-06 NOTE — TELEPHONE ENCOUNTER
MD Indy Gustafson, RN    Caller: Unspecified (Today,  8:36 AM)               As he is having issues with his hip, I think this is likely related and recommend he contact ortho for recommendations. Surgery looked like it was planned for December but wonder if he's having new symptoms if this would need to be moved up.  thx

## 2020-11-06 NOTE — TELEPHONE ENCOUNTER
Returned call to patient. Two patient indentifiers verified. Pt stated that he is having leg discomfort/pain since doing some work with his son. Pt stated that the pain comes when he lays down but will only be there for a couple of hours and then goes away. Denies any swelling, redness or warmth to his leg. Pt stated the pain started up higher on his leg and has moved down to his calf. Pt was informed message will be sent to MD for any recommendations.

## 2020-11-06 NOTE — TELEPHONE ENCOUNTER
Returned call to patient. Two patient indentifiers verified. Pt was informed of the message. Pt verbalized understanding and denies any further questions.

## 2020-11-06 NOTE — TELEPHONE ENCOUNTER
Patient stated that he has a few questions in regards to his appointment on 10/30/20. Please advise.     Phone #: [phone]  Thanks

## 2020-11-09 ENCOUNTER — HOSPITAL ENCOUNTER (OUTPATIENT)
Dept: GENERAL RADIOLOGY | Age: 50
Discharge: HOME | End: 2020-11-09
Attending: FAMILY MEDICINE
Payer: COMMERCIAL

## 2020-11-09 DIAGNOSIS — Z01.818 PRE-OP EVALUATION: ICD-10-CM

## 2020-11-09 PROCEDURE — 71046 X-RAY EXAM CHEST 2 VIEWS: CPT

## 2020-11-09 NOTE — PROGRESS NOTES
Dear Mr. Donell Joseph,    I wanted to follow up on your recent test results:     Your chest x ray is normal.

## 2020-11-23 ENCOUNTER — TELEPHONE (OUTPATIENT)
Dept: FAMILY MEDICINE CLINIC | Age: 50
End: 2020-11-23

## 2020-11-23 NOTE — TELEPHONE ENCOUNTER
Malu Christianson from [facility] calling to get EKG strips.  Patient is having surgery on 12/09/202 fax to [phone]        Best call back # [phone]

## 2021-01-20 ENCOUNTER — TRANSCRIBE ORDER (OUTPATIENT)
Dept: GENERAL RADIOLOGY | Age: 51
End: 2021-01-20

## 2021-01-20 ENCOUNTER — HOSPITAL ENCOUNTER (OUTPATIENT)
Dept: GENERAL RADIOLOGY | Age: 51
Discharge: HOME | End: 2021-01-20
Payer: COMMERCIAL

## 2021-01-20 DIAGNOSIS — M25.559 HIP PAIN: ICD-10-CM

## 2021-01-20 DIAGNOSIS — M25.551 RIGHT HIP PAIN: Primary | ICD-10-CM

## 2021-01-20 DIAGNOSIS — M16.9 OSTEOARTHRITIS OF HIP: Primary | ICD-10-CM

## 2021-01-20 DIAGNOSIS — M25.551 RIGHT HIP PAIN: ICD-10-CM

## 2021-01-20 PROCEDURE — 73502 X-RAY EXAM HIP UNI 2-3 VIEWS: CPT | Performed by: ORTHOPAEDIC SURGERY

## 2021-02-11 LAB — PSA SERPL-MCNC: 2.3 NG/ML (ref 0–4)

## 2021-03-19 ENCOUNTER — PATIENT MESSAGE (OUTPATIENT)
Dept: CARDIOLOGY CLINIC | Age: 51
End: 2021-03-19

## 2021-03-19 DIAGNOSIS — R00.2 PALPITATION: Primary | ICD-10-CM

## 2021-03-19 NOTE — TELEPHONE ENCOUNTER
----- Message from Tony Dow RN sent at 3/19/2021 10:29 AM EDT -----  2 week loop for palp.  Dr. Liliane Dimas.    Thank you  Shanda Benito

## 2022-02-08 ENCOUNTER — TRANSCRIBE ORDER (OUTPATIENT)
Dept: GENERAL RADIOLOGY | Age: 52
End: 2022-02-08

## 2022-02-08 ENCOUNTER — HOSPITAL ENCOUNTER (OUTPATIENT)
Dept: GENERAL RADIOLOGY | Age: 52
Discharge: HOME | End: 2022-02-08

## 2022-02-08 DIAGNOSIS — M16.11 PRIMARY OSTEOARTHRITIS OF RIGHT HIP: Primary | ICD-10-CM

## 2022-02-08 DIAGNOSIS — M25.559 HIP PAIN: ICD-10-CM

## 2022-02-08 DIAGNOSIS — M16.11 PRIMARY OSTEOARTHRITIS OF RIGHT HIP: ICD-10-CM

## 2022-03-18 PROBLEM — R07.9 CHEST PAIN: Status: ACTIVE | Noted: 2017-04-23

## 2022-03-19 PROBLEM — E55.9 VITAMIN D DEFICIENCY: Status: ACTIVE | Noted: 2017-04-23

## 2022-09-15 ENCOUNTER — OFFICE VISIT (OUTPATIENT)
Dept: CARDIOLOGY CLINIC | Age: 52
End: 2022-09-15
Payer: COMMERCIAL

## 2022-09-15 VITALS
BODY MASS INDEX: 22.98 KG/M2 | HEART RATE: 69 BPM | WEIGHT: 173.4 LBS | RESPIRATION RATE: 18 BRPM | HEIGHT: 73 IN | DIASTOLIC BLOOD PRESSURE: 64 MMHG | OXYGEN SATURATION: 100 % | SYSTOLIC BLOOD PRESSURE: 98 MMHG

## 2022-09-15 DIAGNOSIS — R07.89 OTHER CHEST PAIN: Primary | ICD-10-CM

## 2022-09-15 DIAGNOSIS — Z82.49 FAMILY HISTORY OF EARLY CAD: ICD-10-CM

## 2022-09-15 DIAGNOSIS — I45.10 INCOMPLETE RBBB: ICD-10-CM

## 2022-09-15 PROCEDURE — 99214 OFFICE O/P EST MOD 30 MIN: CPT | Performed by: INTERNAL MEDICINE

## 2022-09-15 PROCEDURE — 93000 ELECTROCARDIOGRAM COMPLETE: CPT | Performed by: INTERNAL MEDICINE

## 2022-09-15 NOTE — LETTER
Patient:  Wilda Villalpando   YOB: 1970  Date of Visit: 9/15/2022      Dear Kevin Butler MD  [address] [facility] [address]  Via In Basket:      Mr. Wilda Villalpando is a 55 yo M with a family history of early coronary artery disease, right total hip surgery with Dr. Edy Peraza on 12/09/2020. Stress test in 2017 was normal. EKG with incomplete RBBB. PVCs, PACs noted on 4/2021 loop. He is here now due to he has had some episodes of chest discomfort that have happened this past year. One happened when he was playing golf last August.  He does note it was a hot day in the 90s and hilly, but in the 16th hole he started having some dull chest discomfort. More recently, he was aerating the yard and after several hours he felt a similar slight chest discomfort. Otherwise, he is active and denies any shortness of breath. No significant palpitations, lightheadedness or dizziness. He has been dealing with issues with his right hip, but this has been okay. He is compensated on exam with clear lungs and no lower extremity edema. His EKG was normal sinus rhythm, incomplete right bundle branch block unchanged from prior. He did have coronary calcium score in 2015 that was zero. Assessment and Plan:   1. Chest pain. Pain with typical and atypical features; will proceed with a treadmill stress test for further evaluation. If this is unrevealing, likely musculoskeletal etiology. 2. Family history of early coronary artery disease. He did have coronary calcium score that was zero, this was several years ago now. I do think it is reasonable to repeat it; however, if this is also very low or zero, would not repeat this further. 3. Incomplete right bundle branch block.       If you have questions, please do not hesitate to call me    Sincerely,      Oseas Gomez MD

## 2022-09-15 NOTE — PROGRESS NOTES
[facility] [address]: [facility]  [phone]    History of Present Illness:  Mr. Nakul Gordon is a 53 yo M with a family history of early coronary artery disease, right total hip surgery with Dr. Ata Shipley on 12/09/2020. Stress test in 2017 was normal. EKG with incomplete RBBB. PVCs, PACs noted on 4/2021 loop. He is here now due to he has had some episodes of chest discomfort that have happened this past year. One happened when he was playing golf last August.  He does note it was a hot day in the 90s and hilly, but in the 16th hole he started having some dull chest discomfort. More recently, he was aerating the yard and after several hours he felt a similar slight chest discomfort. Otherwise, he is active and denies any shortness of breath. No significant palpitations, lightheadedness or dizziness. He has been dealing with issues with his right hip, but this has been okay. He is compensated on exam with clear lungs and no lower extremity edema. His EKG was normal sinus rhythm, incomplete right bundle branch block unchanged from prior. He did have coronary calcium score in 2015 that was zero. Assessment and Plan:   1. Chest pain. Pain with typical and atypical features; will proceed with a treadmill stress test for further evaluation. If this is unrevealing, likely musculoskeletal etiology. 2. Family history of early coronary artery disease. He did have coronary calcium score that was zero, this was several years ago now. I do think it is reasonable to repeat it; however, if this is also very low or zero, would not repeat this further. 3. Incomplete right bundle branch block. He  has a past medical history of Arthritis. All other systems negative except as above. PE  Vitals:    09/15/22 0855   BP: 98/64   Pulse: 69   Resp: 18   SpO2: 100%   Weight: 173 lb 6.4 oz (78.7 kg)   Height: 6' 1\" (1.854 m)      Body mass index is 22.88 kg/m².    General appearance - alert, well appearing, and in no distress  Mental status - affect appropriate to mood  Eyes - sclera anicteric, moist mucous membranes  Neck - supple, no JVD  Chest - clear to auscultation, no wheezes, rales or rhonchi  Heart - normal rate, regular rhythm, normal S1, S2, no murmurs, rubs, clicks or gallops  Abdomen - soft, nontender, nondistended, no masses or organomegaly  Neurological -  no focal deficit  Extremities - peripheral pulses normal, no pedal edema    Recent Labs:  Lab Results   Component Value Date/Time    Cholesterol, total 201 (H) 10/28/2020 04:26 PM    HDL Cholesterol 72 10/28/2020 04:26 PM    LDL, calculated 118 (H) 10/28/2020 04:26 PM    Triglyceride 58 10/28/2020 04:26 PM     Lab Results   Component Value Date/Time    Creatinine 1.00 10/28/2020 04:26 PM     Lab Results   Component Value Date/Time    BUN 12 10/28/2020 04:26 PM     Lab Results   Component Value Date/Time    Potassium 4.1 10/28/2020 04:26 PM     Lab Results   Component Value Date/Time    Hemoglobin A1c 5.6 10/28/2020 04:26 PM     Lab Results   Component Value Date/Time    HGB 15.0 10/28/2020 04:26 PM     Lab Results   Component Value Date/Time    PLATELET 449 86/47/7668 04:26 PM       Reviewed:  Past Medical History:   Diagnosis Date    Arthritis      Social History     Tobacco Use   Smoking Status Never   Smokeless Tobacco Never     Social History     Substance and Sexual Activity   Alcohol Use Yes    Alcohol/week: 4.0 standard drinks    Types: 4 Cans of beer per week    Comment: occassionally     No Known Allergies    Current Outpatient Medications   Medication Sig    mupirocin (BACTROBAN) 2 % ointment SWAB EACH NOSTRIL D FOR 2 DAYS PRIOR TO SURGERY AND THE MORNING OF SURGERY. THEN APPLY TO INCISON POST-OP UTD (Patient not taking: Reported on 9/15/2022)     No current facility-administered medications for this visit.        Allison Sandhu MD  [facility] heart and Vascular [place]  [address] [facility] [address]  [address]

## 2022-09-19 ENCOUNTER — ANCILLARY PROCEDURE (OUTPATIENT)
Dept: CARDIOLOGY CLINIC | Age: 52
End: 2022-09-19
Payer: COMMERCIAL

## 2022-09-19 VITALS
WEIGHT: 173 LBS | DIASTOLIC BLOOD PRESSURE: 70 MMHG | HEIGHT: 73 IN | BODY MASS INDEX: 22.93 KG/M2 | SYSTOLIC BLOOD PRESSURE: 110 MMHG

## 2022-09-19 DIAGNOSIS — Z82.49 FAMILY HISTORY OF EARLY CAD: ICD-10-CM

## 2022-09-19 DIAGNOSIS — I45.10 INCOMPLETE RBBB: ICD-10-CM

## 2022-09-19 DIAGNOSIS — R07.89 OTHER CHEST PAIN: ICD-10-CM

## 2022-09-19 LAB
STRESS ANGINA INDEX: 0
STRESS BASELINE DIAS BP: 70 MMHG
STRESS BASELINE HR: 74 BPM
STRESS BASELINE ST DEPRESSION: 0 MM
STRESS BASELINE SYS BP: 110 MMHG
STRESS ESTIMATED WORKLOAD: 17.2 METS
STRESS EXERCISE DUR MIN: 15 MIN
STRESS EXERCISE DUR SEC: 1 SEC
STRESS O2 SAT PEAK: 99 %
STRESS O2 SAT REST: 99 %
STRESS PEAK DIAS BP: 76 MMHG
STRESS PEAK SYS BP: 160 MMHG
STRESS PERCENT HR ACHIEVED: 101 %
STRESS POST PEAK HR: 173 BPM
STRESS RATE PRESSURE PRODUCT: NORMAL BPM*MMHG
STRESS SR DUKE TREADMILL SCORE: 15
STRESS ST DEPRESSION: 0 MM
STRESS TARGET HR: 171 BPM

## 2022-09-19 PROCEDURE — 93015 CV STRESS TEST SUPVJ I&R: CPT | Performed by: INTERNAL MEDICINE

## 2022-09-20 ENCOUNTER — TELEPHONE (OUTPATIENT)
Dept: CARDIOLOGY CLINIC | Age: 52
End: 2022-09-20

## 2022-09-20 NOTE — TELEPHONE ENCOUNTER
MD Bossman Wood, RN  Please let pt know stress test was normal. Thx    Left message with the above information and asked for return call with any questions

## 2024-04-17 ENCOUNTER — TELEPHONE (OUTPATIENT)
Age: 54
End: 2024-04-17

## 2024-04-17 NOTE — TELEPHONE ENCOUNTER
Patient called stated that he has appointment on May 03, 2024 and want to know if he could get his lab work done a week before appointment so results would be there when he come in.

## 2024-04-17 NOTE — TELEPHONE ENCOUNTER
Called Patient. Name and  confirmed.  Informed him as per Dr. Joy's note. Verbalized understanding.

## 2024-05-03 ENCOUNTER — OFFICE VISIT (OUTPATIENT)
Age: 54
End: 2024-05-03
Payer: COMMERCIAL

## 2024-05-03 VITALS
SYSTOLIC BLOOD PRESSURE: 102 MMHG | RESPIRATION RATE: 14 BRPM | HEIGHT: 73 IN | TEMPERATURE: 97.5 F | BODY MASS INDEX: 22.82 KG/M2 | HEART RATE: 74 BPM | OXYGEN SATURATION: 97 % | DIASTOLIC BLOOD PRESSURE: 66 MMHG | WEIGHT: 172.2 LBS

## 2024-05-03 DIAGNOSIS — Z13.1 SCREENING FOR DIABETES MELLITUS: ICD-10-CM

## 2024-05-03 DIAGNOSIS — Z23 ENCOUNTER FOR IMMUNIZATION: ICD-10-CM

## 2024-05-03 DIAGNOSIS — Z00.00 ROUTINE GENERAL MEDICAL EXAMINATION AT HEALTH CARE FACILITY: Primary | ICD-10-CM

## 2024-05-03 DIAGNOSIS — Z13.220 SCREENING FOR LIPID DISORDERS: ICD-10-CM

## 2024-05-03 DIAGNOSIS — Z12.5 PROSTATE CANCER SCREENING: ICD-10-CM

## 2024-05-03 LAB
ALBUMIN SERPL-MCNC: 3.8 G/DL (ref 3.5–5)
ALBUMIN/GLOB SERPL: 1.2 (ref 1.1–2.2)
ALP SERPL-CCNC: 57 U/L (ref 45–117)
ALT SERPL-CCNC: 17 U/L (ref 12–78)
ANION GAP SERPL CALC-SCNC: 3 MMOL/L (ref 5–15)
AST SERPL-CCNC: 20 U/L (ref 15–37)
BILIRUB SERPL-MCNC: 0.8 MG/DL (ref 0.2–1)
BUN SERPL-MCNC: 18 MG/DL (ref 6–20)
BUN/CREAT SERPL: 18 (ref 12–20)
CALCIUM SERPL-MCNC: 9.1 MG/DL (ref 8.5–10.1)
CHLORIDE SERPL-SCNC: 106 MMOL/L (ref 97–108)
CHOLEST SERPL-MCNC: 164 MG/DL
CO2 SERPL-SCNC: 29 MMOL/L (ref 21–32)
CREAT SERPL-MCNC: 1.02 MG/DL (ref 0.7–1.3)
ERYTHROCYTE [DISTWIDTH] IN BLOOD BY AUTOMATED COUNT: 12.3 % (ref 11.5–14.5)
EST. AVERAGE GLUCOSE BLD GHB EST-MCNC: 111 MG/DL
GLOBULIN SER CALC-MCNC: 3.1 G/DL (ref 2–4)
GLUCOSE SERPL-MCNC: 90 MG/DL (ref 65–100)
HBA1C MFR BLD: 5.5 % (ref 4–5.6)
HCT VFR BLD AUTO: 44 % (ref 36.6–50.3)
HDLC SERPL-MCNC: 60 MG/DL
HDLC SERPL: 2.7 (ref 0–5)
HGB BLD-MCNC: 14.4 G/DL (ref 12.1–17)
LDLC SERPL CALC-MCNC: 92.8 MG/DL (ref 0–100)
MCH RBC QN AUTO: 29.4 PG (ref 26–34)
MCHC RBC AUTO-ENTMCNC: 32.7 G/DL (ref 30–36.5)
MCV RBC AUTO: 89.8 FL (ref 80–99)
NRBC # BLD: 0 K/UL (ref 0–0.01)
NRBC BLD-RTO: 0 PER 100 WBC
PLATELET # BLD AUTO: 170 K/UL (ref 150–400)
PMV BLD AUTO: 11.3 FL (ref 8.9–12.9)
POTASSIUM SERPL-SCNC: 4.3 MMOL/L (ref 3.5–5.1)
PROT SERPL-MCNC: 6.9 G/DL (ref 6.4–8.2)
PSA SERPL-MCNC: 1.8 NG/ML (ref 0.01–4)
RBC # BLD AUTO: 4.9 M/UL (ref 4.1–5.7)
SODIUM SERPL-SCNC: 138 MMOL/L (ref 136–145)
TRIGL SERPL-MCNC: 56 MG/DL
VLDLC SERPL CALC-MCNC: 11.2 MG/DL
WBC # BLD AUTO: 3.6 K/UL (ref 4.1–11.1)

## 2024-05-03 PROCEDURE — 99386 PREV VISIT NEW AGE 40-64: CPT | Performed by: FAMILY MEDICINE

## 2024-05-03 PROCEDURE — 90471 IMMUNIZATION ADMIN: CPT | Performed by: FAMILY MEDICINE

## 2024-05-03 PROCEDURE — 90715 TDAP VACCINE 7 YRS/> IM: CPT | Performed by: FAMILY MEDICINE

## 2024-05-03 SDOH — ECONOMIC STABILITY: HOUSING INSECURITY
IN THE LAST 12 MONTHS, WAS THERE A TIME WHEN YOU DID NOT HAVE A STEADY PLACE TO SLEEP OR SLEPT IN A SHELTER (INCLUDING NOW)?: NO

## 2024-05-03 SDOH — ECONOMIC STABILITY: INCOME INSECURITY: HOW HARD IS IT FOR YOU TO PAY FOR THE VERY BASICS LIKE FOOD, HOUSING, MEDICAL CARE, AND HEATING?: NOT HARD AT ALL

## 2024-05-03 SDOH — ECONOMIC STABILITY: FOOD INSECURITY: WITHIN THE PAST 12 MONTHS, THE FOOD YOU BOUGHT JUST DIDN'T LAST AND YOU DIDN'T HAVE MONEY TO GET MORE.: NEVER TRUE

## 2024-05-03 SDOH — ECONOMIC STABILITY: FOOD INSECURITY: WITHIN THE PAST 12 MONTHS, YOU WORRIED THAT YOUR FOOD WOULD RUN OUT BEFORE YOU GOT MONEY TO BUY MORE.: NEVER TRUE

## 2024-05-03 ASSESSMENT — ENCOUNTER SYMPTOMS
CHEST TIGHTNESS: 0
COUGH: 0
WHEEZING: 0
CONSTIPATION: 0
ABDOMINAL PAIN: 0
VOMITING: 0
NAUSEA: 0
DIARRHEA: 0
SHORTNESS OF BREATH: 0

## 2024-05-03 ASSESSMENT — PATIENT HEALTH QUESTIONNAIRE - PHQ9
SUM OF ALL RESPONSES TO PHQ9 QUESTIONS 1 & 2: 0
2. FEELING DOWN, DEPRESSED OR HOPELESS: NOT AT ALL
SUM OF ALL RESPONSES TO PHQ QUESTIONS 1-9: 0
SUM OF ALL RESPONSES TO PHQ QUESTIONS 1-9: 0
1. LITTLE INTEREST OR PLEASURE IN DOING THINGS: NOT AT ALL
SUM OF ALL RESPONSES TO PHQ QUESTIONS 1-9: 0
SUM OF ALL RESPONSES TO PHQ QUESTIONS 1-9: 0

## 2024-05-03 NOTE — PROGRESS NOTES
Patient Name: Deejay Judd   MRN: [id number]    ASSESSMENT AND PLAN  Deejay Judd is a 53 y.o. male who presents today for:    1. Routine general medical examination at health care facility  Reviewed age appropriate screening tests as recommended by the [employer] Preventive Services Database with patient today.  - CBC; Future  - Comprehensive Metabolic Panel; Future  - Hemoglobin A1C; Future  - Lipid Panel; Future  - PSA Screening; Future  - PSA Screening  - Lipid Panel  - Hemoglobin A1C  - Comprehensive Metabolic Panel  - CBC    2. Screening for diabetes mellitus  - Hemoglobin A1C; Future  - Hemoglobin A1C    3. Screening for lipid disorders  - CBC; Future  - Comprehensive Metabolic Panel; Future  - Lipid Panel; Future  - Lipid Panel  - Comprehensive Metabolic Panel  - CBC    4. Prostate cancer screening  - PSA Screening; Future  - PSA Screening    5. Encounter for immunization  - Tdap, BOOSTRIX, (age 10 yrs+), IM        No orders of the defined types were placed in this encounter.       Medications Discontinued During This Encounter   Medication Reason    mupirocin (BACTROBAN) 2 % ointment LIST CLEANUP       Return in about 1 year (around 5/3/2025) for CPE.      SUBJECTIVE  Deejay Judd is a 53 y.o. male who presents with the following:     Colon Cancer Screening: up to date; done by Dr. Florian.   PSA: no fhx of prostate or personal hx of prostate issues; pt would like PSA  Lab Results   Component Value Date/Time    PSA 2.3 02/10/2021 12:42 PM    PSA 5.3 10/28/2020 04:26 PM     CAD risk factors:  HTN: wnl  BP Readings from Last 3 Encounters:   05/03/24 102/66   09/19/22 110/70   09/15/22 98/64     Lipid:  not on statin  Lab Results   Component Value Date    CHOL 201 (H) 10/28/2020    TRIG 58 10/28/2020    HDL 72 10/28/2020    VLDL 11 10/28/2020     DM: due  Hemoglobin A1C   Date Value Ref Range Status   10/28/2020 5.6 4.8 - 5.6 % Final     Comment:              Prediabetes: 5.7 - 6.4           Diabetes: >6.4

## 2024-05-03 NOTE — PROGRESS NOTES
Chief Complaint   Patient presents with    New Patient     \"Have you been to the ER, urgent care clinic since your last visit?  Hospitalized since your last visit?\"    NO    “Have you seen or consulted any other health care providers outside of [facility] since your last visit?”    NO    “Have you had a colorectal cancer screening such as a colonoscopy/FIT/Cologuard?    Yes. Dr. Florian     No colonoscopy on file  No cologuard on file  No FIT/FOBT on file   No flexible sigmoidoscopy on file                 Financial Resource Strain: Low Risk  (5/3/2024)    Overall Financial Resource Strain (CARDIA)     Difficulty of Paying Living Expenses: Not hard at all      Food Insecurity: No Food Insecurity (5/3/2024)    Hunger Vital Sign     Worried About Running Out of Food in the Last Year: Never true     Ran Out of Food in the Last Year: Never true            5/3/2024     9:14 AM   PHQ-9    Little interest or pleasure in doing things 0   Feeling down, depressed, or hopeless 0   PHQ-2 Score 0   PHQ-9 Total Score 0       Health Maintenance Due   Topic Date Due    Hepatitis B vaccine (1 of 3 - 3-dose series) Never done    COVID-19 Vaccine (1) Never done    HIV screen  Never done    Hepatitis C screen  Never done    DTaP/Tdap/Td vaccine (1 - Tdap) Never done    Colorectal Cancer Screen  Never done    Shingles vaccine (1 of 2) Never done    Depression Screen  09/15/2023

## 2024-09-19 ENCOUNTER — TELEPHONE (OUTPATIENT)
Age: 54
End: 2024-09-19

## 2024-09-23 ENCOUNTER — OFFICE VISIT (OUTPATIENT)
Age: 54
End: 2024-09-23
Payer: COMMERCIAL

## 2024-09-23 VITALS
HEIGHT: 73 IN | SYSTOLIC BLOOD PRESSURE: 106 MMHG | BODY MASS INDEX: 23.3 KG/M2 | DIASTOLIC BLOOD PRESSURE: 66 MMHG | OXYGEN SATURATION: 98 % | HEART RATE: 57 BPM | WEIGHT: 175.8 LBS

## 2024-09-23 DIAGNOSIS — I49.3 PVC (PREMATURE VENTRICULAR CONTRACTION): ICD-10-CM

## 2024-09-23 DIAGNOSIS — R07.9 CHEST PAIN, UNSPECIFIED TYPE: Primary | ICD-10-CM

## 2024-09-23 DIAGNOSIS — I45.10 INCOMPLETE RBBB: ICD-10-CM

## 2024-09-23 DIAGNOSIS — Z82.49 FAMILY HISTORY OF EARLY CAD: ICD-10-CM

## 2024-09-23 PROCEDURE — 93000 ELECTROCARDIOGRAM COMPLETE: CPT | Performed by: INTERNAL MEDICINE

## 2024-09-23 PROCEDURE — 99214 OFFICE O/P EST MOD 30 MIN: CPT | Performed by: INTERNAL MEDICINE

## 2024-09-23 ASSESSMENT — PATIENT HEALTH QUESTIONNAIRE - PHQ9
SUM OF ALL RESPONSES TO PHQ QUESTIONS 1-9: 0
1. LITTLE INTEREST OR PLEASURE IN DOING THINGS: NOT AT ALL
2. FEELING DOWN, DEPRESSED OR HOPELESS: NOT AT ALL
SUM OF ALL RESPONSES TO PHQ QUESTIONS 1-9: 0
SUM OF ALL RESPONSES TO PHQ9 QUESTIONS 1 & 2: 0

## 2024-10-02 ENCOUNTER — TELEPHONE (OUTPATIENT)
Age: 54
End: 2024-10-02

## 2024-10-02 NOTE — TELEPHONE ENCOUNTER
----- Message from Dr. Dewey Garcia MD sent at 10/2/2024  3:02 PM EDT -----  Please let pt know stress test was normal going 15 minutes on treadmill. This is very consistent with his chest pain being non cardiac. If having recurrent symptoms, recommend follow up with PCP to evaluate for non cardiac etiologies. thx